# Patient Record
Sex: FEMALE | Race: WHITE | NOT HISPANIC OR LATINO | URBAN - METROPOLITAN AREA
[De-identification: names, ages, dates, MRNs, and addresses within clinical notes are randomized per-mention and may not be internally consistent; named-entity substitution may affect disease eponyms.]

---

## 2019-04-16 ENCOUNTER — IMPORTED ENCOUNTER (OUTPATIENT)
Dept: URBAN - METROPOLITAN AREA CLINIC 50 | Facility: CLINIC | Age: 59
End: 2019-04-16

## 2020-08-04 ENCOUNTER — IMPORTED ENCOUNTER (OUTPATIENT)
Dept: URBAN - METROPOLITAN AREA CLINIC 50 | Facility: CLINIC | Age: 60
End: 2020-08-04

## 2020-09-03 ENCOUNTER — IMPORTED ENCOUNTER (OUTPATIENT)
Dept: URBAN - METROPOLITAN AREA CLINIC 50 | Facility: CLINIC | Age: 60
End: 2020-09-03

## 2020-09-11 ENCOUNTER — IMPORTED ENCOUNTER (OUTPATIENT)
Dept: URBAN - METROPOLITAN AREA CLINIC 50 | Facility: CLINIC | Age: 60
End: 2020-09-11

## 2020-09-11 NOTE — PATIENT DISCUSSION
"""Patient was in for a refraction recheck today.  Trial framed patient with what was found in the ""

## 2021-02-26 ENCOUNTER — IMPORTED ENCOUNTER (OUTPATIENT)
Dept: URBAN - METROPOLITAN AREA CLINIC 50 | Facility: CLINIC | Age: 61
End: 2021-02-26

## 2021-03-01 ENCOUNTER — IMPORTED ENCOUNTER (OUTPATIENT)
Dept: URBAN - METROPOLITAN AREA CLINIC 50 | Facility: CLINIC | Age: 61
End: 2021-03-01

## 2021-03-04 ENCOUNTER — IMPORTED ENCOUNTER (OUTPATIENT)
Dept: URBAN - METROPOLITAN AREA CLINIC 50 | Facility: CLINIC | Age: 61
End: 2021-03-04

## 2021-03-08 ENCOUNTER — IMPORTED ENCOUNTER (OUTPATIENT)
Dept: URBAN - METROPOLITAN AREA CLINIC 50 | Facility: CLINIC | Age: 61
End: 2021-03-08

## 2021-04-05 ENCOUNTER — IMPORTED ENCOUNTER (OUTPATIENT)
Dept: URBAN - METROPOLITAN AREA CLINIC 50 | Facility: CLINIC | Age: 61
End: 2021-04-05

## 2021-04-06 ENCOUNTER — IMPORTED ENCOUNTER (OUTPATIENT)
Dept: URBAN - METROPOLITAN AREA CLINIC 50 | Facility: CLINIC | Age: 61
End: 2021-04-06

## 2021-04-17 ASSESSMENT — VISUAL ACUITY
OS_SC: 20/200
OS_CC: J1@ 16 IN
OS_CC: J1+@ 13 IN
OS_CC: J16@ 16 IN
OS_CC: J1+
OD_CC: J1+
OS_CC: 20/30
OD_CC: 20/20
OS_CC: J1
OD_CC: 20/20
OS_CC: 20/40+1
OS_CC: 20/30
OD_CC: J1@ 16 IN
OD_CC: J1+@ 13 IN
OD_CC: 20/20
OD_CC: J16@ 16 IN
OD_CC: J1
OS_CC: 20/20
OS_CC: J1+
OD_SC: 20/200
OS_PH: @ 16 IN
OD_CC: 20/20
OD_CC: 20/20+2
OD_CC: J1+
OS_CC: 20/30
OS_PH: 20/20
OD_PH: @ 16 IN

## 2021-04-17 ASSESSMENT — TONOMETRY
OS_IOP_MMHG: 17
OD_IOP_MMHG: 14
OD_IOP_MMHG: 14
OS_IOP_MMHG: 14
OD_IOP_MMHG: 15
OS_IOP_MMHG: 14
OD_IOP_MMHG: 16
OS_IOP_MMHG: 14

## 2022-01-14 ENCOUNTER — PREPPED CHART (OUTPATIENT)
Dept: URBAN - METROPOLITAN AREA CLINIC 53 | Facility: CLINIC | Age: 62
End: 2022-01-14

## 2022-01-17 ENCOUNTER — EMERGENCY VISIT (OUTPATIENT)
Dept: URBAN - METROPOLITAN AREA CLINIC 53 | Facility: CLINIC | Age: 62
End: 2022-01-17

## 2022-01-17 DIAGNOSIS — H10.12: ICD-10-CM

## 2022-01-17 PROCEDURE — 92012 INTRM OPH EXAM EST PATIENT: CPT

## 2022-01-17 ASSESSMENT — VISUAL ACUITY
OS_PH: 20/30
OS_CC: 20/40
OD_CC: 20/30-1
OD_PH: 20/25
OU_CC: 20/30

## 2022-01-17 ASSESSMENT — TONOMETRY
OS_IOP_MMHG: 14
OD_IOP_MMHG: 13

## 2022-02-28 ENCOUNTER — FOLLOW UP (OUTPATIENT)
Dept: URBAN - METROPOLITAN AREA CLINIC 53 | Facility: CLINIC | Age: 62
End: 2022-02-28

## 2022-02-28 DIAGNOSIS — H10.12: ICD-10-CM

## 2022-02-28 DIAGNOSIS — H25.13: ICD-10-CM

## 2022-02-28 DIAGNOSIS — H43.813: ICD-10-CM

## 2022-02-28 PROCEDURE — 92014 COMPRE OPH EXAM EST PT 1/>: CPT

## 2022-02-28 ASSESSMENT — VISUAL ACUITY
OS_CC: 20/30
OD_CC: 20/25-1

## 2022-02-28 ASSESSMENT — TONOMETRY
OS_IOP_MMHG: 13
OD_IOP_MMHG: 13

## 2022-12-22 ENCOUNTER — FOLLOW UP (OUTPATIENT)
Dept: URBAN - METROPOLITAN AREA CLINIC 52 | Facility: CLINIC | Age: 62
End: 2022-12-22

## 2022-12-22 PROCEDURE — 92012 INTRM OPH EXAM EST PATIENT: CPT

## 2022-12-22 ASSESSMENT — VISUAL ACUITY
OU_CC: 20/30-1
OS_CC: 20/40-2
OD_CC: 20/30-2
OS_GLARE: 20/70
OS_GLARE: 20/60
OD_GLARE: 20/40
OD_GLARE: 20/50

## 2022-12-22 ASSESSMENT — TONOMETRY
OD_IOP_MMHG: 17
OS_IOP_MMHG: 16

## 2023-01-09 ENCOUNTER — PRE-OP/H&P (OUTPATIENT)
Dept: URBAN - METROPOLITAN AREA CLINIC 53 | Facility: CLINIC | Age: 63
End: 2023-01-09

## 2023-01-09 DIAGNOSIS — H25.13: ICD-10-CM

## 2023-01-09 PROCEDURE — PREOP PRE OP VISIT

## 2023-01-09 PROCEDURE — 92136 OPHTHALMIC BIOMETRY: CPT

## 2023-01-09 PROCEDURE — 92025-3 CORNEAL TOPO, REFUSED

## 2023-01-09 ASSESSMENT — KERATOMETRY
OS_AXISANGLE_DEGREES: 40
OS_AXISANGLE2_DEGREES: 130
OD_AXISANGLE_DEGREES: 155
OD_K1POWER_DIOPTERS: 43.12
OD_AXISANGLE2_DEGREES: 65
OD_K2POWER_DIOPTERS: 42.87
OS_K2POWER_DIOPTERS: 42.75
OS_K1POWER_DIOPTERS: 43.25

## 2023-01-09 ASSESSMENT — VISUAL ACUITY
OD_CC: 20/25
OS_CC: 20/25-2

## 2023-01-09 ASSESSMENT — TONOMETRY
OD_IOP_MMHG: 13
OS_IOP_MMHG: 14

## 2023-06-20 ENCOUNTER — PRE-OP/H&P (OUTPATIENT)
Dept: URBAN - METROPOLITAN AREA CLINIC 50 | Facility: CLINIC | Age: 63
End: 2023-06-20

## 2023-06-20 DIAGNOSIS — H43.813: ICD-10-CM

## 2023-06-20 DIAGNOSIS — H25.13: ICD-10-CM

## 2023-06-20 PROCEDURE — 92134 CPTRZ OPH DX IMG PST SGM RTA: CPT

## 2023-06-20 PROCEDURE — 92136 OPHTHALMIC BIOMETRY: CPT

## 2023-06-20 PROCEDURE — PREOP PRE OP VISIT

## 2023-06-20 ASSESSMENT — VISUAL ACUITY
OS_SC: 20/50
OD_GLARE: 20/50
OS_GLARE: 20/200
OS_PH: 20/40
OS_GLARE: 20/200
OD_SC: 20/30-1
OD_GLARE: 20/70

## 2023-06-20 ASSESSMENT — TONOMETRY
OD_IOP_MMHG: 14
OS_IOP_MMHG: 15

## 2023-06-20 ASSESSMENT — KERATOMETRY
OS_K1POWER_DIOPTERS: 42.75
OD_AXISANGLE_DEGREES: 150
OS_AXISANGLE_DEGREES: 0
OD_AXISANGLE2_DEGREES: 60
OD_K1POWER_DIOPTERS: 42.87
OD_K2POWER_DIOPTERS: 42.87
OS_AXISANGLE2_DEGREES: 90
OS_K2POWER_DIOPTERS: 42.37

## 2023-06-28 ENCOUNTER — SURGERY/PROCEDURE (OUTPATIENT)
Dept: URBAN - METROPOLITAN AREA SURGERY 16 | Facility: SURGERY | Age: 63
End: 2023-06-28

## 2023-06-28 ENCOUNTER — POST-OP (OUTPATIENT)
Dept: URBAN - METROPOLITAN AREA CLINIC 52 | Facility: CLINIC | Age: 63
End: 2023-06-28

## 2023-06-28 DIAGNOSIS — Z98.42: ICD-10-CM

## 2023-06-28 DIAGNOSIS — H25.12: ICD-10-CM

## 2023-06-28 DIAGNOSIS — Z96.1: ICD-10-CM

## 2023-06-28 PROCEDURE — 99199PCV CUSTOM VISION

## 2023-06-28 PROCEDURE — 66984CV REMOVE CATARACT, INSERT LENS, CUSTOM VISION

## 2023-06-28 ASSESSMENT — KERATOMETRY
OS_K2POWER_DIOPTERS: 42.37
OD_K2POWER_DIOPTERS: 42.87
OD_AXISANGLE_DEGREES: 150
OD_AXISANGLE_DEGREES: 150
OD_AXISANGLE2_DEGREES: 60
OD_K2POWER_DIOPTERS: 42.87
OS_K1POWER_DIOPTERS: 42.75
OS_K2POWER_DIOPTERS: 42.37
OS_AXISANGLE_DEGREES: 0
OS_AXISANGLE2_DEGREES: 90
OD_K1POWER_DIOPTERS: 42.87
OS_K1POWER_DIOPTERS: 42.75
OS_AXISANGLE_DEGREES: 0
OS_AXISANGLE2_DEGREES: 90
OD_AXISANGLE2_DEGREES: 60
OD_K1POWER_DIOPTERS: 42.87

## 2023-06-28 ASSESSMENT — VISUAL ACUITY: OS_SC: 20/50

## 2023-06-28 ASSESSMENT — TONOMETRY: OS_IOP_MMHG: 15

## 2023-07-06 ENCOUNTER — POST OP/EVAL OF SECOND EYE (OUTPATIENT)
Dept: URBAN - METROPOLITAN AREA CLINIC 52 | Facility: CLINIC | Age: 63
End: 2023-07-06

## 2023-07-06 DIAGNOSIS — H25.11: ICD-10-CM

## 2023-07-06 PROCEDURE — 99213 OFFICE O/P EST LOW 20 MIN: CPT

## 2023-07-06 PROCEDURE — 92136 - 2N OPHTHALMIC BIOMETRY BY PARTIAL COHERENCE INTERFEROMETRY WITH INTRAOCULAR LENS POWER CALCULATION

## 2023-07-06 ASSESSMENT — TONOMETRY
OD_IOP_MMHG: 13
OS_IOP_MMHG: 14

## 2023-07-06 ASSESSMENT — VISUAL ACUITY
OD_CC: 20/25
OS_SC: 20/25
OS_SC: 20/25 @ 21"

## 2023-07-12 ENCOUNTER — POST-OP (OUTPATIENT)
Dept: URBAN - METROPOLITAN AREA CLINIC 52 | Facility: CLINIC | Age: 63
End: 2023-07-12

## 2023-07-12 ENCOUNTER — SURGERY/PROCEDURE (OUTPATIENT)
Dept: URBAN - METROPOLITAN AREA SURGERY 16 | Facility: SURGERY | Age: 63
End: 2023-07-12

## 2023-07-12 DIAGNOSIS — H25.11: ICD-10-CM

## 2023-07-12 DIAGNOSIS — Z96.1: ICD-10-CM

## 2023-07-12 PROCEDURE — 99199PCV CUSTOM VISION

## 2023-07-12 PROCEDURE — 66984CV REMOVE CATARACT, INSERT LENS, CUSTOM VISION

## 2023-07-12 PROCEDURE — 99024 POSTOP FOLLOW-UP VISIT: CPT

## 2023-07-12 ASSESSMENT — TONOMETRY: OD_IOP_MMHG: 19

## 2023-07-12 ASSESSMENT — VISUAL ACUITY: OD_SC: 20/40

## 2023-07-20 ENCOUNTER — POST-OP (OUTPATIENT)
Dept: URBAN - METROPOLITAN AREA CLINIC 50 | Facility: CLINIC | Age: 63
End: 2023-07-20

## 2023-07-20 DIAGNOSIS — Z96.1: ICD-10-CM

## 2023-07-20 DIAGNOSIS — Z98.41: ICD-10-CM

## 2023-07-20 RX ORDER — POVIDONE 2.5 MG/.5G: SOLUTION, GEL FORMING / DROPS OPHTHALMIC AS NEEDED

## 2023-07-20 ASSESSMENT — KERATOMETRY
OD_AXISANGLE2_DEGREES: 3
OS_K2POWER_DIOPTERS: 43.25
OD_K2POWER_DIOPTERS: 43.00
OD_K1POWER_DIOPTERS: 42.25
OS_K1POWER_DIOPTERS: 43.25
OS_AXISANGLE_DEGREES: 180
OD_AXISANGLE_DEGREES: 93
OS_AXISANGLE2_DEGREES: 90

## 2023-07-20 ASSESSMENT — TONOMETRY
OD_IOP_MMHG: 14
OS_IOP_MMHG: 15

## 2023-07-20 ASSESSMENT — VISUAL ACUITY
OD_SC: J16@16"
OD_SC: 20/20-1
OS_SC: 20/25
OD_SC: 20/70@22"
OS_SC: 20/200@22"
OU_SC: J3@16"
OS_SC: 20/400@16"
OU_SC: 20/50@22"
OU_SC: 20/20

## 2023-08-10 ENCOUNTER — POST-OP (OUTPATIENT)
Dept: URBAN - METROPOLITAN AREA CLINIC 52 | Facility: CLINIC | Age: 63
End: 2023-08-10

## 2023-08-10 DIAGNOSIS — Z96.1: ICD-10-CM

## 2023-08-10 DIAGNOSIS — Z98.42: ICD-10-CM

## 2023-08-10 DIAGNOSIS — Z98.41: ICD-10-CM

## 2023-08-10 PROCEDURE — 99024 POSTOP FOLLOW-UP VISIT: CPT

## 2023-08-10 PROCEDURE — 92015 DETERMINE REFRACTIVE STATE: CPT

## 2023-08-10 ASSESSMENT — KERATOMETRY
OD_AXISANGLE_DEGREES: 93
OS_K1POWER_DIOPTERS: 43.25
OD_K1POWER_DIOPTERS: 42.25
OS_AXISANGLE2_DEGREES: 90
OD_K2POWER_DIOPTERS: 43.00
OD_AXISANGLE2_DEGREES: 3
OS_K2POWER_DIOPTERS: 43.25
OS_AXISANGLE_DEGREES: 180

## 2023-08-10 ASSESSMENT — VISUAL ACUITY
OU_SC: J2
OD_SC: 20/20
OU_SC: 20/20
OS_SC: 20/20
OU_SC: J4

## 2023-08-10 ASSESSMENT — TONOMETRY
OS_IOP_MMHG: 12
OD_IOP_MMHG: 11

## 2024-09-13 ENCOUNTER — COMPREHENSIVE EXAM (OUTPATIENT)
Dept: URBAN - METROPOLITAN AREA CLINIC 49 | Facility: LOCATION | Age: 64
End: 2024-09-13

## 2024-09-13 DIAGNOSIS — H43.813: ICD-10-CM

## 2024-09-13 DIAGNOSIS — M35.01: ICD-10-CM

## 2024-09-13 DIAGNOSIS — H26.493: ICD-10-CM

## 2024-09-13 PROCEDURE — 92014 COMPRE OPH EXAM EST PT 1/>: CPT
